# Patient Record
Sex: FEMALE | Race: WHITE | Employment: STUDENT | URBAN - METROPOLITAN AREA
[De-identification: names, ages, dates, MRNs, and addresses within clinical notes are randomized per-mention and may not be internally consistent; named-entity substitution may affect disease eponyms.]

---

## 2024-09-14 ENCOUNTER — HOSPITAL ENCOUNTER (EMERGENCY)
Facility: HOSPITAL | Age: 24
Discharge: HOME | End: 2024-09-15
Attending: EMERGENCY MEDICINE
Payer: COMMERCIAL

## 2024-09-14 ENCOUNTER — APPOINTMENT (OUTPATIENT)
Dept: GENERAL RADIOLOGY | Facility: HOSPITAL | Age: 24
End: 2024-09-14
Payer: COMMERCIAL

## 2024-09-14 DIAGNOSIS — R07.89 CHEST PAIN, ATYPICAL: ICD-10-CM

## 2024-09-14 DIAGNOSIS — R00.2 PALPITATIONS: Primary | ICD-10-CM

## 2024-09-14 LAB
ALBUMIN SERPL-MCNC: 4.5 G/DL (ref 3.2–4.8)
ALBUMIN/GLOB SERPL: 1.7 {RATIO} (ref 1–2)
ALP LIVER SERPL-CCNC: 58 U/L
ALT SERPL-CCNC: 12 U/L
ANION GAP SERPL CALC-SCNC: 7 MMOL/L (ref 0–18)
AST SERPL-CCNC: 14 U/L (ref ?–34)
BASOPHILS # BLD AUTO: 0.05 X10(3) UL (ref 0–0.2)
BASOPHILS NFR BLD AUTO: 0.6 %
BILIRUB SERPL-MCNC: 0.4 MG/DL (ref 0.3–1.2)
BUN BLD-MCNC: 11 MG/DL (ref 9–23)
CALCIUM BLD-MCNC: 9.8 MG/DL (ref 8.7–10.4)
CHLORIDE SERPL-SCNC: 106 MMOL/L (ref 98–112)
CO2 SERPL-SCNC: 22 MMOL/L (ref 21–32)
CREAT BLD-MCNC: 0.84 MG/DL
D DIMER PPP FEU-MCNC: <0.27 UG/ML FEU (ref ?–0.5)
EGFRCR SERPLBLD CKD-EPI 2021: 101 ML/MIN/1.73M2 (ref 60–?)
EOSINOPHIL # BLD AUTO: 0.36 X10(3) UL (ref 0–0.7)
EOSINOPHIL NFR BLD AUTO: 4.2 %
ERYTHROCYTE [DISTWIDTH] IN BLOOD BY AUTOMATED COUNT: 12.6 %
GLOBULIN PLAS-MCNC: 2.7 G/DL (ref 2–3.5)
GLUCOSE BLD-MCNC: 108 MG/DL (ref 70–99)
HCT VFR BLD AUTO: 38.8 %
HGB BLD-MCNC: 13.9 G/DL
IMM GRANULOCYTES # BLD AUTO: 0.01 X10(3) UL (ref 0–1)
IMM GRANULOCYTES NFR BLD: 0.1 %
LYMPHOCYTES # BLD AUTO: 3.05 X10(3) UL (ref 1–4)
LYMPHOCYTES NFR BLD AUTO: 35.5 %
MAGNESIUM SERPL-MCNC: 1.9 MG/DL (ref 1.6–2.6)
MCH RBC QN AUTO: 30.1 PG (ref 26–34)
MCHC RBC AUTO-ENTMCNC: 35.8 G/DL (ref 31–37)
MCV RBC AUTO: 84 FL
MONOCYTES # BLD AUTO: 0.56 X10(3) UL (ref 0.1–1)
MONOCYTES NFR BLD AUTO: 6.5 %
NEUTROPHILS # BLD AUTO: 4.57 X10 (3) UL (ref 1.5–7.7)
NEUTROPHILS # BLD AUTO: 4.57 X10(3) UL (ref 1.5–7.7)
NEUTROPHILS NFR BLD AUTO: 53.1 %
OSMOLALITY SERPL CALC.SUM OF ELEC: 280 MOSM/KG (ref 275–295)
PLATELET # BLD AUTO: 335 10(3)UL (ref 150–450)
POTASSIUM SERPL-SCNC: 3.8 MMOL/L (ref 3.5–5.1)
PROT SERPL-MCNC: 7.2 G/DL (ref 5.7–8.2)
RBC # BLD AUTO: 4.62 X10(6)UL
SODIUM SERPL-SCNC: 135 MMOL/L (ref 136–145)
TROPONIN I SERPL HS-MCNC: <3 NG/L
TSI SER-ACNC: 4.32 MIU/ML (ref 0.55–4.78)
WBC # BLD AUTO: 8.6 X10(3) UL (ref 4–11)

## 2024-09-14 PROCEDURE — 80053 COMPREHEN METABOLIC PANEL: CPT | Performed by: EMERGENCY MEDICINE

## 2024-09-14 PROCEDURE — 93005 ELECTROCARDIOGRAM TRACING: CPT

## 2024-09-14 PROCEDURE — 85379 FIBRIN DEGRADATION QUANT: CPT | Performed by: EMERGENCY MEDICINE

## 2024-09-14 PROCEDURE — 84484 ASSAY OF TROPONIN QUANT: CPT

## 2024-09-14 PROCEDURE — 85025 COMPLETE CBC W/AUTO DIFF WBC: CPT

## 2024-09-14 PROCEDURE — 84443 ASSAY THYROID STIM HORMONE: CPT | Performed by: EMERGENCY MEDICINE

## 2024-09-14 PROCEDURE — 93010 ELECTROCARDIOGRAM REPORT: CPT

## 2024-09-14 PROCEDURE — 99284 EMERGENCY DEPT VISIT MOD MDM: CPT

## 2024-09-14 PROCEDURE — 80053 COMPREHEN METABOLIC PANEL: CPT

## 2024-09-14 PROCEDURE — 71045 X-RAY EXAM CHEST 1 VIEW: CPT | Performed by: EMERGENCY MEDICINE

## 2024-09-14 PROCEDURE — 99285 EMERGENCY DEPT VISIT HI MDM: CPT

## 2024-09-14 PROCEDURE — 85025 COMPLETE CBC W/AUTO DIFF WBC: CPT | Performed by: EMERGENCY MEDICINE

## 2024-09-14 PROCEDURE — 71045 X-RAY EXAM CHEST 1 VIEW: CPT

## 2024-09-14 PROCEDURE — 83735 ASSAY OF MAGNESIUM: CPT | Performed by: EMERGENCY MEDICINE

## 2024-09-14 PROCEDURE — 84484 ASSAY OF TROPONIN QUANT: CPT | Performed by: EMERGENCY MEDICINE

## 2024-09-14 PROCEDURE — 36415 COLL VENOUS BLD VENIPUNCTURE: CPT

## 2024-09-14 RX ORDER — ALLOPURINOL 100 MG/1
100 TABLET ORAL DAILY
COMMUNITY

## 2024-09-14 RX ORDER — FLUOXETINE 40 MG/1
40 CAPSULE ORAL DAILY
COMMUNITY

## 2024-09-14 RX ORDER — SPIRONOLACTONE 100 MG/1
200 TABLET, FILM COATED ORAL DAILY
COMMUNITY

## 2024-09-15 VITALS
HEIGHT: 63 IN | DIASTOLIC BLOOD PRESSURE: 74 MMHG | RESPIRATION RATE: 19 BRPM | TEMPERATURE: 98 F | OXYGEN SATURATION: 99 % | BODY MASS INDEX: 23.04 KG/M2 | HEART RATE: 97 BPM | SYSTOLIC BLOOD PRESSURE: 106 MMHG | WEIGHT: 130 LBS

## 2024-09-15 LAB
ATRIAL RATE: 80 BPM
ATRIAL RATE: 99 BPM
B-HCG UR QL: NEGATIVE
P AXIS: 59 DEGREES
P AXIS: 61 DEGREES
P-R INTERVAL: 132 MS
P-R INTERVAL: 134 MS
Q-T INTERVAL: 382 MS
Q-T INTERVAL: 412 MS
QRS DURATION: 128 MS
QRS DURATION: 128 MS
QTC CALCULATION (BEZET): 475 MS
QTC CALCULATION (BEZET): 490 MS
R AXIS: 31 DEGREES
R AXIS: 44 DEGREES
T AXIS: -30 DEGREES
T AXIS: 0 DEGREES
TROPONIN I SERPL HS-MCNC: <3 NG/L
VENTRICULAR RATE: 80 BPM
VENTRICULAR RATE: 99 BPM

## 2024-09-15 PROCEDURE — 84484 ASSAY OF TROPONIN QUANT: CPT | Performed by: EMERGENCY MEDICINE

## 2024-09-15 PROCEDURE — 93005 ELECTROCARDIOGRAM TRACING: CPT

## 2024-09-15 PROCEDURE — 81025 URINE PREGNANCY TEST: CPT

## 2024-09-15 NOTE — ED PROVIDER NOTES
Patient Seen in: [facility] Emergency Department      History     Chief Complaint   Patient presents with    Chest Pain Angina     Stated Complaint: chest pain    Subjective:   HPI    Patient is a 24-year-old female presenting to the ED complaining of intermittent chest pain as well as palpitations over the past week.  The history is obtained from patient who is a good historian.  The patient states that her heart rate has been elevated and has been as high as 103 bpm.  The patient does have a history of WPW status post ablation in 2022.  She was seen in [place] at that time.  She does have a history of anxiety as well as \"prediabetes\" and experiences intermittent lightheadedness that is worse with standing.  Denies any change in oral intake.  No vomiting or diarrhea.  No history of hyperthyroidism.  She was taking Sudafed but has not taken any in the last week since her symptoms started.  She recently completed amoxicillin for an ear infection.  She has not had any recent fevers or chills.  No current cough or URI symptoms.  No recent travel or surgery.  She is on oral OCPs.  She does not smoke cigarettes.  No illicit drug use.  No caffeine intake.  When she experiences chest pain she feels like her chest is \"caving in\" and can also experience a sharp sensation that is nonradiating, intermittent, without identified exacerbating or alleviating factors.  Her chest pain and palpitations can occur together or independently.  They do not always occur at the same time.  She occasionally feels short of breath.  She does still work out and do weight lifting.  She occasionally experiences the symptoms while she is working out.  She has no history of hypertension, no history of dyslipidemia, her grandfather had a cardiac history but her mother and father do not at this time.    Objective:   Past Medical History:    Anxiety    Diabetes (HCC)    Jaylyn-Parkinson-White syndrome              Past Surgical History:    Procedure Laterality Date    Endovas non-cardiac abl cath      Fracture surgery                  Social History     Socioeconomic History    Marital status: Single   Tobacco Use    Smoking status: Never    Smokeless tobacco: Never   Vaping Use    Vaping status: Never Used   Substance and Sexual Activity    Alcohol use: Yes     Comment: socially    Drug use: Never              Review of Systems    Positive for stated Chief Complaint: Chest Pain Angina    Other systems are as noted in HPI.  Constitutional and vital signs reviewed.      All other systems reviewed and negative except as noted above.    Physical Exam     ED Triage Vitals [09/14/24 2115]   /74   Pulse 102   Resp 18   Temp 97.7 °F (36.5 °C)   Temp src    SpO2 96 %   O2 Device None (Room air)       Current Vitals:   Vital Signs  BP: 106/74  Pulse: 97  Resp: 19  Temp: 97.7 °F (36.5 °C)  MAP (mmHg): 88    Oxygen Therapy  SpO2: 99 %  O2 Device: None (Room air)            Physical Exam  Vitals and nursing note reviewed.   Constitutional:       General: She is not in acute distress.     Appearance: Normal appearance. She is well-developed. She is not ill-appearing or toxic-appearing.      Comments: Patient is on cardiac monitor with normal sinus rhythm noted.   HENT:      Head: Normocephalic and atraumatic.      Right Ear: External ear normal.      Left Ear: External ear normal.      Mouth/Throat:      Mouth: Mucous membranes are moist.      Pharynx: Oropharynx is clear.   Eyes:      Conjunctiva/sclera: Conjunctivae normal.   Cardiovascular:      Rate and Rhythm: Normal rate and regular rhythm.      Heart sounds: Normal heart sounds.   Pulmonary:      Effort: Pulmonary effort is normal.      Breath sounds: Normal breath sounds.   Abdominal:      General: Abdomen is flat. Bowel sounds are normal. There is no distension.      Tenderness: There is no abdominal tenderness.   Musculoskeletal:      Right lower leg: No edema.      Left lower leg: No edema.    Skin:     General: Skin is warm.      Capillary Refill: Capillary refill takes less than 2 seconds.      Findings: No rash.   Neurological:      General: No focal deficit present.      Mental Status: She is alert and oriented to person, place, and time.   Psychiatric:         Mood and Affect: Mood normal.         Behavior: Behavior normal.               ED Course     Labs Reviewed   COMP METABOLIC PANEL (14) - Abnormal; Notable for the following components:       Result Value    Glucose 108 (*)     Sodium 135 (*)     All other components within normal limits   TROPONIN I HIGH SENSITIVITY - Normal   D-DIMER - Normal   MAGNESIUM - Normal   TSH W REFLEX TO FREE T4 - Normal   POCT PREGNANCY URINE - Normal   CBC WITH DIFFERENTIAL WITH PLATELET   TROPONIN I HIGH SENSITIVITY   RAINBOW DRAW BLUE     EKG    Rate, intervals and axes as noted on EKG Report.  Rate: 99  Rhythm: Sinus Rhythm  Reading: Intraventricular block, T wave inversion in inferior and anterior leads.      Patient's previous EKG reports were reviewed.  Patient does have a history of right bundle branch block.  A repeat EKG was performed.  Results noted below.    EKG    Rate, intervals and axes as noted on EKG Report.  Rate: 80  Rhythm: Sinus Rhythm  Reading: Right bundle branch block.  This appears to be more consistent with patient's previous EKG readings.  Although, EKG cannot be directly visualized.                              MDM      History obtained from patient.     Differential diagnosis includes cardiac dysrhythmia, electrolyte disturbance, dehydration, anxiety, GERD, hyperthyroidism.    Previous records reviewed including previous EKG readings.  The patient is typically seen in the [facility].  She has not establish any local cardiology follow-up.  Her previous EKGs were noted to have a right bundle branch block.    Testing considered and ordered includes PE, chest x-ray, CBC, CMP, D-dimer, magnesium, TSH, UCG, troponin x 2    I  reviewed all results.  CBC and CMP unremarkable.  Glucose at 108.  Troponin is negative.  Repeat troponin normal.  Exam is normal.  D-dimer is negative.  TSH is normal.    I personally reviewed the radiographs and my individual interpretation shows findings.  I also reviewed the official report which shows   XR CHEST AP PORTABLE  (CPT=71045)    Result Date: 9/14/2024  PROCEDURE:  XR CHEST AP PORTABLE  (CPT=71045)  TECHNIQUE:  AP chest radiograph was obtained.  COMPARISON:  None.  INDICATIONS:  chest pain  PATIENT STATED HISTORY: (As transcribed by Technologist)  Patient states central anterior chest pains with elevated HR and some shortness of breath over the last week. Hx Ablation;Congenital Heart defect    FINDINGS:  The heart size appears within normal limits.  There is no focal infiltrate, consolidation or pulmonary edema.  Lung fields are clear.  No diaphragmatic or pleural abnormality is seen.  The clayton and mediastinum appear within normal limits.            CONCLUSION:  No acute cardiopulmonary abnormality is appreciated.   LOCATION:  [facility]      Dictated by (CST): Erich Heath MD on 9/14/2024 at 11:57 PM     Finalized by (CST): Erich Heath MD on 9/14/2024 at 11:58 PM            Interventions in care included none required in ED.  Patient vital signs remained stable.  Blood pressure stable.  Orthostatic results were reviewed.  Will give patient p.o. in ED.    Will plan for outpatient cardiology follow-up and return to ED if any symptoms worsen, persist, or new symptoms develop.  Will provide outpatient follow-up information as patient is currently residing in the area.  Patient may also require outpatient cardiac monitoring.                                         Medical Decision Making      Disposition and Plan     Clinical Impression:  No diagnosis found.     Disposition:  There is no disposition on file for this visit.  There is no disposition time on file for this visit.    Follow-up:  No  follow-up provider specified.        Medications Prescribed:  Current Discharge Medication List

## 2024-09-15 NOTE — ED INITIAL ASSESSMENT (HPI)
A&Ox3 patient pmh dodson-parkinson-white syndrome and ablation in 2022 p/w chest pain    Patient endorses palpitations/fluttering sensation x2 days w/ intermittent L sided chest pain, now becoming more constant \"feels like my chest is caving in\"    +GONZALES    Denies any n/v/d/c/f/LH/vision changes/urinary sx at this time    RR even/NL, speaking in full clear sentences, ambulatory w/ steady gait

## 2024-12-28 ENCOUNTER — HOSPITAL ENCOUNTER (EMERGENCY)
Facility: HOSPITAL | Age: 24
Discharge: HOME | End: 2024-12-28
Attending: EMERGENCY MEDICINE
Payer: COMMERCIAL

## 2024-12-28 VITALS
BODY MASS INDEX: 23.04 KG/M2 | TEMPERATURE: 98 F | HEIGHT: 63 IN | DIASTOLIC BLOOD PRESSURE: 71 MMHG | OXYGEN SATURATION: 99 % | HEART RATE: 89 BPM | SYSTOLIC BLOOD PRESSURE: 103 MMHG | RESPIRATION RATE: 16 BRPM | WEIGHT: 130 LBS

## 2024-12-28 DIAGNOSIS — J02.9 PHARYNGITIS, UNSPECIFIED ETIOLOGY: Primary | ICD-10-CM

## 2024-12-28 LAB — S PYO AG THROAT QL: NEGATIVE

## 2024-12-28 PROCEDURE — 87880 STREP A ASSAY W/OPTIC: CPT

## 2024-12-28 PROCEDURE — 99283 EMERGENCY DEPT VISIT LOW MDM: CPT

## 2024-12-28 RX ORDER — METHYLPREDNISOLONE 4 MG/1
TABLET ORAL
Qty: 1 EACH | Refills: 0 | Status: SHIPPED | OUTPATIENT
Start: 2024-12-28

## 2024-12-28 NOTE — ED INITIAL ASSESSMENT (HPI)
Pt to ed w/c of throat infection. Pt was dx w/strep on Poly parrish, reports being prescribed abx and steroids, pt states she has finished her abx course and states the infections is worse. Pt in no distress.

## 2024-12-28 NOTE — ED PROVIDER NOTES
Patient Seen in: [facility] Emergency Department      History     Chief Complaint   Patient presents with    Strep Throat     Stated Complaint: throat pain    Subjective:   HPI      24-year-old female 4 days ago diagnosed with strep pharyngitis at a clinic in [place] prescribed a Z-David and prednisone 40 mg for 3 days who presents with only minimal improvement now back to her baseline symptoms.  No fever.  Pain is located only on the right side of the throat.  It goes into the ear.  No abdominal pain.    Objective:     Past Medical History:    Anxiety    Diabetes (HCC)    Jaylyn-Parkinson-White syndrome              Past Surgical History:   Procedure Laterality Date    Endovas non-cardiac abl cath      Fracture surgery                  Social History     Socioeconomic History    Marital status: Single   Tobacco Use    Smoking status: Never    Smokeless tobacco: Never   Vaping Use    Vaping status: Never Used   Substance and Sexual Activity    Alcohol use: Yes     Comment: socially    Drug use: Never                  Physical Exam     ED Triage Vitals [12/28/24 0632]   /74   Pulse 97   Resp 21   Temp 97.8 °F (36.6 °C)   Temp src    SpO2 97 %   O2 Device None (Room air)       Current Vitals:   Vital Signs  BP: 103/71  Pulse: 89  Resp: 16  Temp: 97.8 °F (36.6 °C)  MAP (mmHg): 84    Oxygen Therapy  SpO2: 99 %  O2 Device: None (Room air)        Physical Exam  Constitutional: Oriented to person, place, and time.  Appears well-developed. No distress.   Head: Normocephalic and atraumatic.   Eyes: Conjunctivae are normal. Pupils are equal, round, and reactive to light.   ENT: Oromucosa moist and without lesion.  The left posterior pharyngeal region is unremarkable.  The right posterior pharyngeal region has erythema minimal swelling and exudates.  No gross clinical evidence peritonsillar abscess.  Neck: Normal range of motion. Neck supple.  Mild bilateral although right greater than left anterior cervical  lymphadenopathy.  Cardiovascular: Normal rate, regular rhythm and intact distal pulses.    Pulmonary/Chest: Effort normal. No respiratory distress.   Abdominal: Soft. There is no tenderness. There is no guarding.  No noted again a megaly.  Musculoskeletal: Normal range of motion. No edema or tenderness.   Neurological: Alert and oriented to person, place, and time.   Skin: Skin is warm and dry.   Nursing note and vitals reviewed.    Differential diagnosis includes viral or other strep pharyngitis.      ED Course     Labs Reviewed   POCT RAPID STREP - Normal                   MDM              Medical Decision Making  Patient looks great.  No clinical findings of peritonsillar abscess or serious bacterial infection.  Explained to patient Z-David is probably not the best course of treatment for an upper airway type infection given strong resistance patterns.  Recommended Tylenol or Motrin.  Will do a Medrol Dosepak and Augmentin.  Strep negative here.  Patient will come back with any worsening or change or increasing swelling difficulty swallowing or speaking.    Problems Addressed:  Pharyngitis, unspecified etiology: acute illness or injury with systemic symptoms    Amount and/or Complexity of Data Reviewed  Labs: ordered. Decision-making details documented in ED Course.    Risk  OTC drugs.  Prescription drug management.        Disposition and Plan     Clinical Impression:  1. Pharyngitis, unspecified etiology         Disposition:  Discharge  12/28/2024  7:55 am    Follow-up:  Torin Patrick MD  [address] [address]  [phone]    Call            Medications Prescribed:  Discharge Medication List as of 12/28/2024  7:56 AM        START taking these medications    Details   methylPREDNISolone 4 MG Oral Tablet Therapy Pack Dosepack: use as directed on packaging, Normal, Disp-1 each, R-0      amoxicillin clavulanate 875-125 MG Oral Tab Take 1 tablet by mouth 2 (two) times daily for 10  days., Normal, Disp-20 tablet, R-0                 Supplementary Documentation: